# Patient Record
Sex: FEMALE | Race: BLACK OR AFRICAN AMERICAN
[De-identification: names, ages, dates, MRNs, and addresses within clinical notes are randomized per-mention and may not be internally consistent; named-entity substitution may affect disease eponyms.]

---

## 2017-08-24 ENCOUNTER — HOSPITAL ENCOUNTER (OUTPATIENT)
Dept: HOSPITAL 45 - C.LAB1850 | Age: 47
Discharge: HOME | End: 2017-08-24
Attending: NURSE PRACTITIONER
Payer: COMMERCIAL

## 2017-08-24 DIAGNOSIS — E03.9: Primary | ICD-10-CM

## 2017-08-24 DIAGNOSIS — I10: ICD-10-CM

## 2017-08-24 LAB
ALBUMIN/GLOB SERPL: 0.7 {RATIO} (ref 0.9–2)
ALP SERPL-CCNC: 88 U/L (ref 45–117)
ALT SERPL-CCNC: 14 U/L (ref 12–78)
ANION GAP SERPL CALC-SCNC: 5 MMOL/L (ref 3–11)
AST SERPL-CCNC: 11 U/L (ref 15–37)
BASOPHILS # BLD: 0.02 K/UL (ref 0–0.2)
BASOPHILS NFR BLD: 0.2 %
BUN SERPL-MCNC: 11 MG/DL (ref 7–18)
BUN/CREAT SERPL: 14.9 (ref 10–20)
CALCIUM SERPL-MCNC: 9.2 MG/DL (ref 8.5–10.1)
CHLORIDE SERPL-SCNC: 106 MMOL/L (ref 98–107)
CHOLEST/HDLC SERPL: 2.5 {RATIO}
CO2 SERPL-SCNC: 29 MMOL/L (ref 21–32)
COMPLETE: YES
CREAT SERPL-MCNC: 0.74 MG/DL (ref 0.6–1.2)
EOSINOPHIL NFR BLD AUTO: 348 K/UL (ref 130–400)
GLOBULIN SER-MCNC: 4.7 GM/DL (ref 2.5–4)
GLUCOSE SERPL-MCNC: 77 MG/DL (ref 70–99)
GLUCOSE UR QL: 70 MG/DL
HCT VFR BLD CALC: 39.1 % (ref 37–47)
IG%: 0.1 %
IMM GRANULOCYTES NFR BLD AUTO: 33.4 %
KETONES UR QL STRIP: 75 MG/DL
LYMPHOCYTES # BLD: 2.68 K/UL (ref 1.2–3.4)
MCH RBC QN AUTO: 27.7 PG (ref 25–34)
MCHC RBC AUTO-ENTMCNC: 31.7 G/DL (ref 32–36)
MCV RBC AUTO: 87.5 FL (ref 80–100)
MONOCYTES NFR BLD: 6.7 %
NEUTROPHILS # BLD AUTO: 5.2 %
NEUTROPHILS NFR BLD AUTO: 54.4 %
NITRITE UR QL STRIP: 166 MG/DL (ref 0–150)
PH UR: 178 MG/DL (ref 0–200)
PMV BLD AUTO: 10.5 FL (ref 7.4–10.4)
POTASSIUM SERPL-SCNC: 3.6 MMOL/L (ref 3.5–5.1)
RBC # BLD AUTO: 4.47 M/UL (ref 4.2–5.4)
SODIUM SERPL-SCNC: 140 MMOL/L (ref 136–145)
TSH SERPL-ACNC: 0.92 UIU/ML (ref 0.3–4.5)
VERY LOW DENSITY LIPOPROT CALC: 33 MG/DL
WBC # BLD AUTO: 8.03 K/UL (ref 4.8–10.8)

## 2017-12-12 ENCOUNTER — HOSPITAL ENCOUNTER (OUTPATIENT)
Dept: HOSPITAL 45 - C.RAD | Age: 47
Discharge: HOME | End: 2017-12-12
Attending: FAMILY MEDICINE
Payer: COMMERCIAL

## 2017-12-12 DIAGNOSIS — M65.312: Primary | ICD-10-CM

## 2017-12-12 NOTE — DIAGNOSTIC IMAGING REPORT
L FINGER(S) MIN 2 VIEWS ROUTINE



HISTORY:  47 years-old Female TRIGGER FINGER OF LEFT THUMB acute pain of the

left thumb



COMPARISON: None available



TECHNIQUE: 3 views of the left thumb



FINDINGS: 

There is mild flexion of the interphalangeal joint and mild extension of the

metacarpal phalangeal joint without acute fracture or dislocation. Mild

degenerative changes are present, notably within the first carpometacarpal

joint. Soft tissues are unremarkable without opaque foreign body.



IMPRESSION: Mild degenerative changes without acute fracture or dislocation. 







The above report was generated using voice recognition software. It may contain

grammatical, syntax or spelling errors.







Electronically signed by:  Santino Marino M.D.

12/12/2017 9:58 AM



Dictated Date/Time:  12/12/2017 9:56 AM

## 2018-08-02 ENCOUNTER — HOSPITAL ENCOUNTER (OUTPATIENT)
Dept: HOSPITAL 45 - C.LAB1850 | Age: 48
Discharge: HOME | End: 2018-08-02
Attending: NURSE PRACTITIONER
Payer: COMMERCIAL

## 2018-08-02 DIAGNOSIS — E03.9: Primary | ICD-10-CM

## 2018-08-02 DIAGNOSIS — I10: ICD-10-CM

## 2018-08-02 LAB
ALBUMIN SERPL-MCNC: 3.7 GM/DL (ref 3.4–5)
ALP SERPL-CCNC: 100 U/L (ref 45–117)
ALT SERPL-CCNC: 20 U/L (ref 12–78)
AST SERPL-CCNC: 16 U/L (ref 15–37)
BUN SERPL-MCNC: 13 MG/DL (ref 7–18)
CALCIUM SERPL-MCNC: 8.8 MG/DL (ref 8.5–10.1)
CO2 SERPL-SCNC: 30 MMOL/L (ref 21–32)
CREAT SERPL-MCNC: 0.91 MG/DL (ref 0.6–1.2)
EOSINOPHIL NFR BLD AUTO: 278 K/UL (ref 130–400)
GLUCOSE SERPL-MCNC: 110 MG/DL (ref 70–99)
HCT VFR BLD CALC: 37 % (ref 37–47)
HGB BLD-MCNC: 12 G/DL (ref 12–16)
MCH RBC QN AUTO: 29.4 PG (ref 25–34)
MCHC RBC AUTO-ENTMCNC: 32.4 G/DL (ref 32–36)
MCV RBC AUTO: 90.7 FL (ref 80–100)
PMV BLD AUTO: 10.9 FL (ref 7.4–10.4)
POTASSIUM SERPL-SCNC: 3.5 MMOL/L (ref 3.5–5.1)
PROT SERPL-MCNC: 7.8 GM/DL (ref 6.4–8.2)
RED CELL DISTRIBUTION WIDTH CV: 15.5 % (ref 11.5–14.5)
RED CELL DISTRIBUTION WIDTH SD: 51.2 FL (ref 36.4–46.3)
SODIUM SERPL-SCNC: 137 MMOL/L (ref 136–145)
WBC # BLD AUTO: 9.54 K/UL (ref 4.8–10.8)